# Patient Record
Sex: MALE | Race: WHITE | NOT HISPANIC OR LATINO
[De-identification: names, ages, dates, MRNs, and addresses within clinical notes are randomized per-mention and may not be internally consistent; named-entity substitution may affect disease eponyms.]

---

## 2022-09-07 ENCOUNTER — TRANSCRIPTION ENCOUNTER (OUTPATIENT)
Age: 72
End: 2022-09-07

## 2024-01-11 PROBLEM — Z00.00 ENCOUNTER FOR PREVENTIVE HEALTH EXAMINATION: Status: ACTIVE | Noted: 2024-01-11

## 2024-01-16 ENCOUNTER — APPOINTMENT (OUTPATIENT)
Dept: BREAST CENTER | Facility: CLINIC | Age: 74
End: 2024-01-16
Payer: MEDICARE

## 2024-01-16 VITALS
HEART RATE: 76 BPM | BODY MASS INDEX: 28.17 KG/M2 | WEIGHT: 208 LBS | SYSTOLIC BLOOD PRESSURE: 136 MMHG | HEIGHT: 72 IN | DIASTOLIC BLOOD PRESSURE: 77 MMHG | OXYGEN SATURATION: 96 %

## 2024-01-16 PROCEDURE — 99203 OFFICE O/P NEW LOW 30 MIN: CPT

## 2024-01-16 RX ORDER — NAPROXEN 500 MG/1
TABLET ORAL
Refills: 0 | Status: ACTIVE | COMMUNITY

## 2024-01-16 NOTE — PHYSICAL EXAM
[No Cervical Adenopathy] : no cervical adenopathy [No Supraclavicular Adenopathy] : no supraclavicular adenopathy [Clear to Auscultation Bilat] : clear to auscultation bilaterally [No Axillary Lymphadenopathy] : no left axillary lymphadenopathy [de-identified] : On the left neck is an 8 x 4 mm eschar with no pigmentation or satellite lesions. [de-identified] : On the anterior chest is a 10 mm eschar that is healing well without infection.

## 2024-01-16 NOTE — HISTORY OF PRESENT ILLNESS
[FreeTextEntry1] : 74-year-old gentleman with a family history of melanoma presents with a 2-month history of a left neck pigmented lesion that has had for a while has been increasing in size.  He went to the dermatologist who found a pigmented lesion and underwent a shave biopsy of that as well as a mid lower chest lesion which eventually showed a seborrheic keratosis.  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  Patient denies any other lesions that he is concerned about and has had a precancer excised on the right temple many years ago.  Patient's father had melanoma which eventually passed away from.  Patient's mother also had basal cell carcinomas.

## 2024-01-30 ENCOUNTER — RESULT REVIEW (OUTPATIENT)
Age: 74
End: 2024-01-30

## 2024-02-06 ENCOUNTER — APPOINTMENT (OUTPATIENT)
Dept: BREAST CENTER | Facility: CLINIC | Age: 74
End: 2024-02-06
Payer: MEDICARE

## 2024-02-06 VITALS
SYSTOLIC BLOOD PRESSURE: 134 MMHG | WEIGHT: 208 LBS | HEART RATE: 86 BPM | BODY MASS INDEX: 28.21 KG/M2 | OXYGEN SATURATION: 94 % | DIASTOLIC BLOOD PRESSURE: 73 MMHG

## 2024-02-06 PROCEDURE — 99024 POSTOP FOLLOW-UP VISIT: CPT

## 2024-02-06 NOTE — REASON FOR VISIT
[Post Op: _________] : a [unfilled] post op visit [FreeTextEntry1] : Status post wide excision of a melanoma in situ from the left neck

## 2024-02-06 NOTE — HISTORY OF PRESENT ILLNESS
[FreeTextEntry1] : 1/24 left neck wide excision of melanoma in situ  Patient is doing well after the surgery, pain under control.  The pathology revealed some residual disease most likely melanoma in situ, but the pathologist is sending it to [facility]. Patient had a right neck lesion excised before the surgery and the pathology was a benign nevus.  74-year-old gentleman with a family history of melanoma presents with a 2-month history of a left neck pigmented lesion that has had for a while has been increasing in size.  He went to the dermatologist who found a pigmented lesion and underwent a shave biopsy of that as well as a mid lower chest lesion which eventually showed a seborrheic keratosis.  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  Patient denies any other lesions that he is concerned about and has had a precancer excised on the right temple many years ago.  Patient's father had melanoma which eventually passed away from.  Patient's mother also had basal cell carcinomas.

## 2024-02-07 ENCOUNTER — NON-APPOINTMENT (OUTPATIENT)
Age: 74
End: 2024-02-07

## 2024-03-19 ENCOUNTER — APPOINTMENT (OUTPATIENT)
Dept: BREAST CENTER | Facility: CLINIC | Age: 74
End: 2024-03-19
Payer: MEDICARE

## 2024-03-19 VITALS
DIASTOLIC BLOOD PRESSURE: 82 MMHG | SYSTOLIC BLOOD PRESSURE: 143 MMHG | BODY MASS INDEX: 27.13 KG/M2 | WEIGHT: 200 LBS | HEART RATE: 77 BPM | OXYGEN SATURATION: 97 %

## 2024-03-19 DIAGNOSIS — R20.0 ANESTHESIA OF SKIN: ICD-10-CM

## 2024-03-19 DIAGNOSIS — D03.4 MELANOMA IN SITU OF SCALP AND NECK: ICD-10-CM

## 2024-03-19 PROCEDURE — 99213 OFFICE O/P EST LOW 20 MIN: CPT

## 2024-03-19 NOTE — PHYSICAL EXAM
[No Supraclavicular Adenopathy] : no supraclavicular adenopathy [No Cervical Adenopathy] : no cervical adenopathy [de-identified] : Left neck incision is healing well without signs of infection. [de-identified] : Patient has a good shoulder shrug.

## 2024-03-19 NOTE — HISTORY OF PRESENT ILLNESS
[FreeTextEntry1] : 1/24 left neck wide excision of melanoma in situ  Patient comes in with left arm numbness that radiates the lateral portion of the left arm.  He states it happened after the neck surgery although he did not mention this when I called him about the follow-up pathology.  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  74-year-old gentleman with a family history of melanoma presents with a 2-month history of a left neck pigmented lesion that has had for a while has been increasing in size.  He went to the dermatologist who found a pigmented lesion and underwent a shave biopsy of that as well as a mid lower chest lesion which eventually showed a seborrheic keratosis.  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  Patient denies any other lesions that he is concerned about and has had a precancer excised on the right temple many years ago.  Patient's father had melanoma which eventually passed away from.  Patient's mother also had basal cell carcinomas.

## 2024-09-03 ENCOUNTER — APPOINTMENT (OUTPATIENT)
Dept: BREAST CENTER | Facility: CLINIC | Age: 74
End: 2024-09-03
Payer: MEDICARE

## 2024-09-03 VITALS
BODY MASS INDEX: 27.13 KG/M2 | DIASTOLIC BLOOD PRESSURE: 87 MMHG | OXYGEN SATURATION: 99 % | HEART RATE: 62 BPM | SYSTOLIC BLOOD PRESSURE: 147 MMHG | WEIGHT: 200 LBS

## 2024-09-03 DIAGNOSIS — D03.4 MELANOMA IN SITU OF SCALP AND NECK: ICD-10-CM

## 2024-09-03 PROCEDURE — 99212 OFFICE O/P EST SF 10 MIN: CPT

## 2024-09-03 RX ORDER — ESCITALOPRAM OXALATE 10 MG/1
10 TABLET ORAL
Refills: 0 | Status: ACTIVE | COMMUNITY

## 2024-09-03 RX ORDER — UBIDECARENONE 200 MG
200 CAPSULE ORAL
Refills: 0 | Status: ACTIVE | COMMUNITY

## 2024-09-03 RX ORDER — GABAPENTIN 300 MG/1
300 CAPSULE ORAL
Refills: 0 | Status: COMPLETED | OUTPATIENT
Start: 2024-09-03

## 2024-09-03 RX ORDER — LOSARTAN POTASSIUM 25 MG/1
25 TABLET, FILM COATED ORAL
Refills: 0 | Status: COMPLETED | OUTPATIENT
Start: 2024-09-03

## 2024-09-03 RX ORDER — PROPRANOLOL HYDROCHLORIDE 20 MG/1
20 TABLET ORAL
Refills: 0 | Status: ACTIVE | COMMUNITY

## 2024-09-03 RX ORDER — CYCLOBENZAPRINE HYDROCHLORIDE 10 MG/1
10 TABLET, FILM COATED ORAL
Refills: 0 | Status: COMPLETED | OUTPATIENT
Start: 2024-09-03

## 2024-09-03 RX ORDER — TESTOSTERONE CYPIONATE 100 MG/ML
100 INJECTION, SOLUTION INTRAMUSCULAR
Refills: 0 | Status: ACTIVE | COMMUNITY

## 2024-09-03 RX ORDER — BUPROPION HYDROCHLORIDE 150 MG/1
150 TABLET, EXTENDED RELEASE ORAL
Refills: 0 | Status: ACTIVE | COMMUNITY

## 2024-09-03 RX ORDER — LORAZEPAM 0.5 MG
TABLET ORAL
Refills: 0 | Status: ACTIVE | COMMUNITY

## 2024-09-03 RX ADMIN — CYCLOBENZAPRINE HYDROCHLORIDE MG: 10 TABLET, FILM COATED ORAL at 00:00

## 2024-09-03 RX ADMIN — LOSARTAN POTASSIUM MG: 25 TABLET, FILM COATED ORAL at 00:00

## 2024-09-03 RX ADMIN — GABAPENTIN MG: 300 CAPSULE ORAL at 00:00

## 2024-09-03 NOTE — REASON FOR VISIT
[Follow-Up: _____] : a [unfilled] follow-up visit [FreeTextEntry1] : Status post wide excision of a melanoma in situ from the left neck

## 2024-09-03 NOTE — HISTORY OF PRESENT ILLNESS
[FreeTextEntry1] : 1/24 left neck wide excision of melanoma in situ  Patient denies any other skin lesions or other symptomatology and is seeing his dermatologist.  Left neck pain is pretty much the same and he has been trying to see a neck specialist but is having a difficult time scheduling an addict.  He is currently being treated for Lyme disease with antibiotics.  74-year-old gentleman with a family history of melanoma presents with a 2-month history of a left neck pigmented lesion that has had for a while has been increasing in size.  He went to the dermatologist who found a pigmented lesion and underwent a shave biopsy of that as well as a mid lower chest lesion which eventually showed a seborrheic keratosis.  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  Patient denies any other lesions that he is concerned about and has had a precancer excised on the right temple many years ago.  Patient's father had melanoma which eventually passed away from.  Patient's mother also had basal cell carcinomas.

## 2024-09-03 NOTE — PHYSICAL EXAM
[No Supraclavicular Adenopathy] : no supraclavicular adenopathy [No Cervical Adenopathy] : no cervical adenopathy [No Axillary Lymphadenopathy] : no left axillary lymphadenopathy [de-identified] : Left neck incision is healing well without any pigment or satellite lesions. [de-identified] : Patient has a good shoulder shrug.

## 2025-03-04 ENCOUNTER — APPOINTMENT (OUTPATIENT)
Dept: BREAST CENTER | Facility: CLINIC | Age: 75
End: 2025-03-04
Payer: MEDICARE

## 2025-03-04 VITALS
SYSTOLIC BLOOD PRESSURE: 133 MMHG | HEIGHT: 72 IN | DIASTOLIC BLOOD PRESSURE: 80 MMHG | BODY MASS INDEX: 27.09 KG/M2 | WEIGHT: 200 LBS | OXYGEN SATURATION: 96 % | HEART RATE: 78 BPM

## 2025-03-04 DIAGNOSIS — D03.4 MELANOMA IN SITU OF SCALP AND NECK: ICD-10-CM

## 2025-03-04 PROCEDURE — 99213 OFFICE O/P EST LOW 20 MIN: CPT
